# Patient Record
Sex: MALE | NOT HISPANIC OR LATINO
[De-identification: names, ages, dates, MRNs, and addresses within clinical notes are randomized per-mention and may not be internally consistent; named-entity substitution may affect disease eponyms.]

---

## 2022-03-01 PROBLEM — Z00.00 ENCOUNTER FOR PREVENTIVE HEALTH EXAMINATION: Status: ACTIVE | Noted: 2022-03-01

## 2022-03-02 ENCOUNTER — APPOINTMENT (OUTPATIENT)
Dept: ORTHOPEDIC SURGERY | Facility: CLINIC | Age: 57
End: 2022-03-02
Payer: MEDICARE

## 2022-03-02 ENCOUNTER — TRANSCRIPTION ENCOUNTER (OUTPATIENT)
Age: 57
End: 2022-03-02

## 2022-03-02 VITALS — BODY MASS INDEX: 23.82 KG/M2 | HEIGHT: 68.5 IN | WEIGHT: 159 LBS

## 2022-03-02 DIAGNOSIS — M75.82 OTHER SHOULDER LESIONS, LEFT SHOULDER: ICD-10-CM

## 2022-03-02 DIAGNOSIS — M25.511 PAIN IN RIGHT SHOULDER: ICD-10-CM

## 2022-03-02 DIAGNOSIS — M25.512 PAIN IN RIGHT SHOULDER: ICD-10-CM

## 2022-03-02 PROCEDURE — 73030 X-RAY EXAM OF SHOULDER: CPT | Mod: 50

## 2022-03-02 PROCEDURE — 20611 DRAIN/INJ JOINT/BURSA W/US: CPT | Mod: 59,RT

## 2022-03-02 PROCEDURE — 99204 OFFICE O/P NEW MOD 45 MIN: CPT | Mod: 25

## 2022-03-02 NOTE — PROCEDURE
[de-identified] : DIAGNOSTIC ULTRASOUND RIGHT SHOULDER\par \par DIAGNOSTIC SONOGRAPHY of the Rotator Cuff Soft Tissue of the RIGHT SHOULDER was performed in Multiple Scan Planes with varying transducer frequencies.\par Imaging of the Supraspinatus Tendon reveals TENDONITIS, BURSITIS, ARTICULAR SIDE DEGENERATION  WITH OUT SIGNIFICANT / COMPLETE TEAR\par Imaging of the Biceps Tendon reveals no significant tear.\par Imaging of the Subscapularis Tendon reveals no significant tear.\par Imaging of the Infraspinatus Tendon reveals no significant tear.\par Key images were save digitally and reviewed with patient.\par \par \par INJECTION RIGHT SHOULDER SA SPACE\par \par Patient has demonstrated limited relief from NSAIDS, rest, exercises / PT, and after discussion of the risks and benefits, the patient has elected to proceed with an ULTRASOUND GUIDED injection into the RIGHT SUBACROMIAL  SPACE LATERAL APPROACH \par  \par Confirmed that the patient does not have history of prior adverse reactions, active, infections, or relevant allergies. There was no effusion, erythema, or warmth, and the skin was clear\par \par The skin was sterilized with alcohol. Ethyl Chloride was used as a topical anesthetic. Routine sterile technique. \par The site was injected UTILIZING ULTRASOUND GUIDANCE to confirm appropriate placement of the needle-\par with a mixture of medication and local anesthetic. The injection was completed without complication and a bandage was applied.\par  \par The patient tolerated the procedure well and was given post-injection instructions.Rec: Cold therapy, analgesics, avoid heavy activity.\par MEDICATION: 4cc of 1% xylocaine + 10mg of KENALOG\par \par

## 2022-03-02 NOTE — PHYSICAL EXAM
[de-identified] : PHYSICAL EXAM LEFT AND RIGHT   SHOULDER\par \par MODERATE PROTRACTION\par AROM \par LEFT  150 / 150 / 90 / 30\par RIGHT   140 / 140 / 70 / 20\par TENDER: SA REGION LATERAL AND ANTERIOR \par \par SPECIAL TESTING :\par POLANCO - POSITIVE \par ELIZABET - POSITIVE \par SPEED TEST - POSITIVE\par \par RIVERS - NEGATIVE \par APPREHENSION AND SUPPRESSION - NEGATIVE \par \par RC STRENGTH TESTING \par SS:  5/5\par SUB 5/5\par IS     5/5\par BICEPS  5/5\par \par SENSATION  - GROSSLY INTACT\par \par \par  [de-identified] : LEFT SHOULDER XRAY (2 VIEWS - AP AND OUTLET) -  \par NO OBVIOUS FRACTURE , SEPARATION OR DISLOCATION \par NO SIGNIFICANT OSTEOARTHRITIS, \par TYPE 2B ACROMION \par CSA= 23.9\par \par \par RIGHT SHOULDER XRAY (2 VIEWS - AP AND OUTLET) -  \par NO OBVIOUS FRACTURE ,  SEPARATION OR DISLOCATION \par NO SIGNIFICANT OSTEOARTHRITIS,\par TYPE 1B ACROMION \par CSA=22.4\par

## 2022-03-02 NOTE — DISCUSSION/SUMMARY
[de-identified] : ULTRASOUND EVALUATION  REVEALS INFLAMMATORY CHANGES WITHOUT SIGNIFICANT TEAR \par PATIENT HAS ELECTED TO PROCEED WITH KENALOG INJECTION SHOULDER \par RISKS AND BENEFITS DISCUSSED - VERBAL CONSENT OBTAINED \par SEE PROCEDURE NOTE\par \par \par POST INJECTION INSTRUCTIONS:\par \par INJECTION THERAPY HANDOUT PROVIDED\par \par COLD THERAPY , ANALGESICS PRN\par \par HOME STRETCHING AND EXERCISES QD  HANDOUT PROVIDED, REVIEWED AND DEMONSTRATED \par \par START P.T.  2 WEEKS AFTER INJECTION - 2 X 4 WEEKS \par \par MRI IF NO RELIEF\par

## 2022-03-02 NOTE — HISTORY OF PRESENT ILLNESS
[de-identified] : BILATERAL SHOULDER PAIN \par LEFT SIDE RECOVERING\par RIGHT SHOUDLER PAIN POSTERIORLY 3/10, WORSE WITH REACHING BEHIND -  PUTTING ON COAT \par PAIN STARTED DECEMBER 2021\par RIGHT WORSE THAN LEFT \par INTERMITTENT\par DULL, SHARP WITH SUDDEN \par 4/25/2018- LAST VISIT WITH \par WORSE WITH OVER HEAD LIFTING, LYING ON BACK, REACHING BEHIND, AT NIGHT \par HAD CORTISONE INJECTION IN THE PAST- HELPFUL\par PT IS GOING TO P.T FOR THE HIPS-DOING EXERCISES  ON RIGHT SHOULDER

## 2022-04-21 ENCOUNTER — APPOINTMENT (OUTPATIENT)
Dept: ORTHOPEDIC SURGERY | Facility: CLINIC | Age: 57
End: 2022-04-21
Payer: MEDICARE

## 2022-04-21 DIAGNOSIS — M75.81 OTHER SHOULDER LESIONS, RIGHT SHOULDER: ICD-10-CM

## 2022-04-21 PROCEDURE — 99213 OFFICE O/P EST LOW 20 MIN: CPT | Mod: 25

## 2022-04-21 PROCEDURE — 20611 DRAIN/INJ JOINT/BURSA W/US: CPT | Mod: RT

## 2022-04-21 NOTE — PHYSICAL EXAM
[de-identified] : PHYSICAL EXAM LEFT AND RIGHT   SHOULDER\par \par MODERATE PROTRACTION\par AROM \par LEFT  150 / 150 / 90 / 30\par RIGHT   135 / 130 / 60 / 0 \par TENDER: SA REGION LATERAL AND ANTERIOR \par \par SPECIAL TESTING :\par POLANCO - POSITIVE \par ELIZABET - POSITIVE \par SPEED TEST - POSITIVE\par \par RIVERS - NEGATIVE \par APPREHENSION AND SUPPRESSION - NEGATIVE \par \par RC STRENGTH TESTING \par SS:  5/5\par SUB 5/5\par IS     5/5\par BICEPS  5/5\par \par SENSATION  - GROSSLY INTACT\par \par \par

## 2022-04-21 NOTE — DISCUSSION/SUMMARY
[de-identified] : \par \par POST INJECTION INSTRUCTIONS:\par \par INJECTION THERAPY HANDOUT PROVIDED\par \par COLD THERAPY , ANALGESICS PRN\par \par HOME STRETCHING AND EXERCISES QD  TBAND 2 HANDOUT PROVIDED, REVIEWED AND DEMONSTRATED \par \par RESUME  P.T.  1 WEEKS AFTER INJECTION - 2 X 4 WEEKS \par \par MRI IF NO RELIEF\par

## 2022-04-21 NOTE — PROCEDURE
[de-identified] : \par INJECTION RIGHT SHOULDER SA SPACE\par \par Patient has demonstrated limited relief from NSAIDS, rest, exercises / PT, and after discussion of the risks and benefits, the patient has elected to proceed with an ULTRASOUND GUIDED injection into the RIGHT SUBACROMIAL  SPACE LATERAL APPROACH \par  \par Confirmed that the patient does not have history of prior adverse reactions, active, infections, or relevant allergies. There was no effusion, erythema, or warmth, and the skin was clear\par \par The skin was sterilized with alcohol. Ethyl Chloride was used as a topical anesthetic. Routine sterile technique. \par The site was injected UTILIZING ULTRASOUND GUIDANCE to confirm appropriate placement of the needle-\par with a mixture of medication and local anesthetic. The injection was completed without complication and a bandage was applied.\par  \par The patient tolerated the procedure well and was given post-injection instructions.Rec: Cold therapy, analgesics, avoid heavy activity.\par MEDICATION: 4cc of 1% xylocaine + 40mg of KENALOG\par \par

## 2022-08-10 ENCOUNTER — APPOINTMENT (OUTPATIENT)
Dept: ORTHOPEDIC SURGERY | Facility: CLINIC | Age: 57
End: 2022-08-10

## 2022-08-10 DIAGNOSIS — M94.261 CHONDROMALACIA, RIGHT KNEE: ICD-10-CM

## 2022-08-10 PROCEDURE — 99214 OFFICE O/P EST MOD 30 MIN: CPT

## 2022-08-10 PROCEDURE — 73562 X-RAY EXAM OF KNEE 3: CPT | Mod: RT

## 2022-08-10 RX ORDER — DICLOFENAC SODIUM 75 MG/1
75 TABLET, DELAYED RELEASE ORAL TWICE DAILY
Qty: 60 | Refills: 5 | Status: ACTIVE | COMMUNITY
Start: 2022-08-10 | End: 1900-01-01

## 2022-08-10 NOTE — HISTORY OF PRESENT ILLNESS
[de-identified] : RIGHT KNEE PAIN \par PAIN STARTED 6 MONTHS AGO-NO SPECIFIC INJURY \par INTERMITTENT \par PAIN LEVEL: 3/10 \par STIFFNESS AND SWELLING \par WORSE WITH WALKING UP AND DOWN STAIRS, GETTING UP FROM A CHAIR \par BETTER WITH RESTING

## 2022-08-10 NOTE — DISCUSSION/SUMMARY
[de-identified] : PATELLOFEMORAL SYNDROME / CHONDROMALACIA\par \par XRAYS REVIEWED = WNL, + TILT\par \par NSAID PRESCRIBED 2-3 WEEKS\par \par ARCH SUPPORTS FOR RUNNING AND SPORTS\par \par HOME STRETCH + VMO PROGRAM - CONSIDER USING FOAM ROLLER\par \par RETURN TO RUNNING / SPORTS AS SYMPTOMS ALLOW\par \par \par \par MRI RULE OUT MENISCUS TEAR \par \par CONSIDER KENALOG, VISCUSUPPLEMENT OR PRP  INJECTION\par \par CONSIDER PF BRACING\par \par CONSIDER ARTHROSCOPY IF NO RELIEF AFTER 6-9 MONTHS TREATMENT\par

## 2022-08-10 NOTE — PHYSICAL EXAM
[de-identified] : PHYSICAL EXAM RIGHT  KNEE\par \par TENDER MEDIAL PF JOINT AND MJLT\par AROM\par EXTENSION = 0\par FLEXION = 130 \par \par SPECIAL TESTS\par \par PATELLAR GRIND = PAINFUL\par DRAWER  = NEG\par LACHMAN = NEG\par MACMURRAY = PAIN \par \par MOTOR = GROSSLY INTACT\par SENSORY = GROSSLY INTACT \par \par \par   [de-identified] : XRAY RIGHT KNEE:\par 4 views of the knee\par PF NARROWING AND SCLEROSIS GRADE 1 \par No obvious fracture or dislocation. \par Alignment within normal limits\par \par \par

## 2023-03-15 ENCOUNTER — LABORATORY RESULT (OUTPATIENT)
Age: 58
End: 2023-03-15

## 2023-03-15 ENCOUNTER — APPOINTMENT (OUTPATIENT)
Dept: ORTHOPEDIC SURGERY | Facility: CLINIC | Age: 58
End: 2023-03-15
Payer: MEDICARE

## 2023-03-15 DIAGNOSIS — M25.462 EFFUSION, LEFT KNEE: ICD-10-CM

## 2023-03-15 PROCEDURE — 73562 X-RAY EXAM OF KNEE 3: CPT | Mod: LT

## 2023-03-15 NOTE — PROCEDURE
[de-identified] : INJECTION ASPIRATION LEFT KNEE\par \par Patient has demonstrated limited relief from NSAIDS, rest, exercises / PT , and after discussion of the risks and benefits, the patient has elected to proceed with a\par n ULTRASOUND GUIDED corticosteroid injection into the LEFT SupeLat PF Knee\par  \par Confirmed that the patient does not have history of prior adverse reactions, active, infections, or relevant allergies. There was no effusion, erythema, or warmth, and the skin was clear\par \par The skin was sterilized with alcohol. Ethyl Chloride was used as a topical anesthetic. Routine sterile technique. \par  \par The KNEE JOINT  was injected utilizing ULTRASOUND GUIDANCEwith KENALOG + LIDOCAINE. The injection was completed without complication and a bandage was applied.\par \par The patient tolerated the procedure well and was given post-injection instructions.Rec: Cold therapy, analgesics, avoid heavy activity.\par \par MEDICATION: Lidocaine 1% 4cc + 10mg of Kenalog\par \par EFFUSION ASPIRATED: 68CC SLIGHTLY CLOUDY YELLOW FLUID\par

## 2023-03-15 NOTE — HISTORY OF PRESENT ILLNESS
[de-identified] : LOCATION:LEFT KNEE \par DURATION:8 DAYS AGO  NO SPECIFIC INJURY \par QUALITY:ACHY \par CONSTANT \par PAIN LEVEL: 0/10, CAN BE 10/10 \par BETTER WITH DICLOFENAC CREAM  \par WORSE WITH WALKING, BENDING \par ASSOCIATED SYMPTOMS: STIFFNESS \par \par

## 2023-03-15 NOTE — PHYSICAL EXAM
[de-identified] : PHYSICAL EXAM LEFT KNEE\par \par AROM\par EXTENSION = 0\par FLEXION = 130 \par \par SPECIAL TESTS\par \par PATELLAR GRIND = NEG\par DRAWER  = NEG\par LACHMAN = NEG\par MACMURRAY = NEG \par \par MOTOR = GROSSLY INTACT\par SENSORY = GROSSLY INTACT \par \par \par   [de-identified] : XRAY LEFT KNEE:\par 4 views of the knee\par PF NARROWING AND SCLEROSIS GRADE 1 \par No obvious fracture or dislocation. \par Alignment within normal limits\par \par \par \par

## 2023-03-15 NOTE — DISCUSSION/SUMMARY
[de-identified] : XRAYS REVEAL KNEE CHONDROMALACIA\par LITTLE RELIEF FROM NSAIDS , EXERCISES \par PATIENT HAS ELECTED TO PROCEED WITH ASPIRATION KENALOG INJECTION KNEE  \par RISKS AND BENEFITS DISCUSSED - VERBAL CONSENT OBTAINED \par SEE PROCEDURE NOTE\par \par \par POST INJECTION INSTRUCTIONS:\par \par INJECTION THERAPY HANDOUT PROVIDED\par \par COLD THERAPY , ANALGESICS PRN\par \par FLUID SENT FOR CRYSTAL , CELL COUNT , C&S\par \par NEEDS BLOOD FOR LYME \par

## 2023-03-16 ENCOUNTER — LABORATORY RESULT (OUTPATIENT)
Age: 58
End: 2023-03-16

## 2023-03-16 LAB
B PERT IGG+IGM PNL SER: ABNORMAL
COLOR FLD: YELLOW
FLUID INTAKE SUBSTANCE CLASS: NORMAL
LYMPHOCYTES # FLD MANUAL: 20 %
MONOS+MACROS NFR FLD MANUAL: 24 %
NEUTS SEG # FLD MANUAL: 56 %
RBC # FLD MANUAL: 1000 /UL
SYCRY CLARITY: ABNORMAL
SYCRY COLOR: YELLOW
SYCRY ID: NORMAL
SYCRY TUBE: NORMAL
TOTAL CELLS COUNTED FLD: 8864 /UL
TUBE TYPE: NORMAL

## 2023-07-14 ENCOUNTER — NON-APPOINTMENT (OUTPATIENT)
Age: 58
End: 2023-07-14

## 2023-08-28 ENCOUNTER — APPOINTMENT (OUTPATIENT)
Dept: ORTHOPEDIC SURGERY | Facility: CLINIC | Age: 58
End: 2023-08-28
Payer: MEDICARE

## 2023-08-28 DIAGNOSIS — M94.262 CHONDROMALACIA, LEFT KNEE: ICD-10-CM

## 2023-08-28 PROCEDURE — 20611 DRAIN/INJ JOINT/BURSA W/US: CPT | Mod: LT

## 2023-08-28 PROCEDURE — 99213 OFFICE O/P EST LOW 20 MIN: CPT | Mod: 25

## 2023-08-28 NOTE — PROCEDURE
[de-identified] : INJECTION ASPIRATION LEFT KNEE  Patient has demonstrated limited relief from NSAIDS, rest, exercises / PT , and after discussion of the risks and benefits, the patient has elected to proceed with a n ULTRASOUND GUIDED corticosteroid injection into the LEFT SupeLat PF Knee   Confirmed that the patient does not have history of prior adverse reactions, active, infections, or relevant allergies. There was no effusion, erythema, or warmth, and the skin was clear  The skin was sterilized with alcohol. Ethyl Chloride was used as a topical anesthetic. Routine sterile technique.    The KNEE JOINT  was injected utilizing ULTRASOUND GUIDANCEwith KENALOG + LIDOCAINE. The injection was completed without complication and a bandage was applied.  The patient tolerated the procedure well and was given post-injection instructions.Rec: Cold therapy, analgesics, avoid heavy activity.  MEDICATION: Lidocaine 1% 4cc + 10mg of Kenalog  EFFUSION ASPIRATED: 3CC CLEAR YELLOW

## 2023-08-28 NOTE — HISTORY OF PRESENT ILLNESS
[de-identified] : LOCATION:LEFT KNEE SWELLING - STARTED ONE MONTH  PREVIOUS FLUID ASPIRATED MARCH 15, 2023  LYME TEST WAS POSITIVE   TREATED WITH DOXY 1 MONTHS    QUALITY:ACHY  CONSTANT  PAIN LEVEL: 0/10, CAN BE 10/10  BETTER WITH DICLOFENAC CREAM   WORSE WITH WALKING, BENDING  ASSOCIATED SYMPTOMS: STIFFNESS

## 2023-08-28 NOTE — PHYSICAL EXAM
[de-identified] : PHYSICAL EXAM LEFT KNEE  ANTERIO KNEE SWELLING  AROM EXTENSION = 0 FLEXION = 130   SPECIAL TESTS  PATELLAR GRIND = NEG DRAWER  = NEG LACHMAN = NEG MACMURRAY = NEG   MOTOR = GROSSLY INTACT SENSORY = GROSSLY INTACT

## 2025-04-21 ENCOUNTER — NON-APPOINTMENT (OUTPATIENT)
Age: 60
End: 2025-04-21

## 2025-04-28 ENCOUNTER — APPOINTMENT (OUTPATIENT)
Dept: ORTHOPEDIC SURGERY | Facility: CLINIC | Age: 60
End: 2025-04-28
Payer: MEDICARE

## 2025-04-28 DIAGNOSIS — M75.82 OTHER SHOULDER LESIONS, LEFT SHOULDER: ICD-10-CM

## 2025-04-28 PROCEDURE — 20611 DRAIN/INJ JOINT/BURSA W/US: CPT | Mod: LT

## 2025-04-28 PROCEDURE — 99214 OFFICE O/P EST MOD 30 MIN: CPT | Mod: 25
